# Patient Record
Sex: MALE | Race: WHITE | Employment: STUDENT | URBAN - NONMETROPOLITAN AREA
[De-identification: names, ages, dates, MRNs, and addresses within clinical notes are randomized per-mention and may not be internally consistent; named-entity substitution may affect disease eponyms.]

---

## 2019-05-15 ENCOUNTER — HOSPITAL ENCOUNTER (OUTPATIENT)
Dept: GENERAL RADIOLOGY | Age: 24
Discharge: HOME | End: 2019-05-17
Payer: COMMERCIAL

## 2019-05-15 ENCOUNTER — OFFICE VISIT (OUTPATIENT)
Dept: FAMILY MEDICINE CLINIC | Age: 24
End: 2019-05-15
Payer: COMMERCIAL

## 2019-05-15 ENCOUNTER — HOSPITAL ENCOUNTER (OUTPATIENT)
Dept: LAB | Age: 24
Discharge: HOME | End: 2019-05-15
Payer: COMMERCIAL

## 2019-05-15 VITALS
RESPIRATION RATE: 14 BRPM | DIASTOLIC BLOOD PRESSURE: 70 MMHG | SYSTOLIC BLOOD PRESSURE: 122 MMHG | HEART RATE: 78 BPM | BODY MASS INDEX: 21.47 KG/M2 | OXYGEN SATURATION: 98 % | HEIGHT: 73 IN | WEIGHT: 162 LBS

## 2019-05-15 DIAGNOSIS — Z76.89 ENCOUNTER TO ESTABLISH CARE: ICD-10-CM

## 2019-05-15 DIAGNOSIS — E78.00 ELEVATED CHOLESTEROL: ICD-10-CM

## 2019-05-15 DIAGNOSIS — M79.671 RIGHT FOOT PAIN: ICD-10-CM

## 2019-05-15 DIAGNOSIS — M21.41 PES PLANUS OF BOTH FEET: ICD-10-CM

## 2019-05-15 DIAGNOSIS — M89.8X6 PAIN OF LEFT FIBULA: ICD-10-CM

## 2019-05-15 DIAGNOSIS — Z13.9 SCREENING FOR CONDITION: ICD-10-CM

## 2019-05-15 DIAGNOSIS — M21.42 PES PLANUS OF BOTH FEET: ICD-10-CM

## 2019-05-15 DIAGNOSIS — Z23 NEED FOR VACCINATION: Primary | ICD-10-CM

## 2019-05-15 LAB
ALBUMIN SERPL-MCNC: 5.3 G/DL (ref 3.5–5.2)
ALBUMIN/GLOBULIN RATIO: 1.8 (ref 1–2.5)
ALP BLD-CCNC: 71 U/L (ref 40–129)
ALT SERPL-CCNC: 25 U/L (ref 5–41)
ANION GAP SERPL CALCULATED.3IONS-SCNC: 12 MMOL/L (ref 9–17)
AST SERPL-CCNC: 25 U/L
BILIRUB SERPL-MCNC: 0.49 MG/DL (ref 0.3–1.2)
BUN BLDV-MCNC: 11 MG/DL (ref 6–20)
BUN/CREAT BLD: 11 (ref 9–20)
CALCIUM SERPL-MCNC: 9.8 MG/DL (ref 8.6–10.4)
CHLORIDE BLD-SCNC: 98 MMOL/L (ref 98–107)
CHOLESTEROL, FASTING: 231 MG/DL
CHOLESTEROL/HDL RATIO: 4.8
CO2: 28 MMOL/L (ref 20–31)
CREAT SERPL-MCNC: 1.03 MG/DL (ref 0.7–1.2)
GFR AFRICAN AMERICAN: >60 ML/MIN
GFR NON-AFRICAN AMERICAN: >60 ML/MIN
GFR SERPL CREATININE-BSD FRML MDRD: ABNORMAL ML/MIN/{1.73_M2}
GFR SERPL CREATININE-BSD FRML MDRD: ABNORMAL ML/MIN/{1.73_M2}
GLUCOSE BLD-MCNC: 90 MG/DL (ref 70–99)
HDLC SERPL-MCNC: 48 MG/DL
LDL CHOLESTEROL: 134 MG/DL (ref 0–130)
POTASSIUM SERPL-SCNC: 4 MMOL/L (ref 3.7–5.3)
SODIUM BLD-SCNC: 138 MMOL/L (ref 135–144)
TOTAL PROTEIN: 8.2 G/DL (ref 6.4–8.3)
TRIGLYCERIDE, FASTING: 243 MG/DL
TSH SERPL DL<=0.05 MIU/L-ACNC: 3.5 MIU/L (ref 0.3–5)
VITAMIN D 25-HYDROXY: 15.2 NG/ML (ref 30–100)
VLDLC SERPL CALC-MCNC: ABNORMAL MG/DL (ref 1–30)

## 2019-05-15 PROCEDURE — 90715 TDAP VACCINE 7 YRS/> IM: CPT | Performed by: PHYSICIAN ASSISTANT

## 2019-05-15 PROCEDURE — 84443 ASSAY THYROID STIM HORMONE: CPT

## 2019-05-15 PROCEDURE — 36415 COLL VENOUS BLD VENIPUNCTURE: CPT

## 2019-05-15 PROCEDURE — 80053 COMPREHEN METABOLIC PANEL: CPT

## 2019-05-15 PROCEDURE — 82306 VITAMIN D 25 HYDROXY: CPT

## 2019-05-15 PROCEDURE — 90471 IMMUNIZATION ADMIN: CPT | Performed by: PHYSICIAN ASSISTANT

## 2019-05-15 PROCEDURE — 73630 X-RAY EXAM OF FOOT: CPT

## 2019-05-15 PROCEDURE — 73590 X-RAY EXAM OF LOWER LEG: CPT

## 2019-05-15 PROCEDURE — 99385 PREV VISIT NEW AGE 18-39: CPT | Performed by: PHYSICIAN ASSISTANT

## 2019-05-15 PROCEDURE — 80061 LIPID PANEL: CPT

## 2019-05-15 ASSESSMENT — ENCOUNTER SYMPTOMS
RESPIRATORY NEGATIVE: 1
DIARRHEA: 0
NAUSEA: 0
VOMITING: 0

## 2019-05-15 ASSESSMENT — PATIENT HEALTH QUESTIONNAIRE - PHQ9
SUM OF ALL RESPONSES TO PHQ QUESTIONS 1-9: 0
SUM OF ALL RESPONSES TO PHQ9 QUESTIONS 1 & 2: 0
1. LITTLE INTEREST OR PLEASURE IN DOING THINGS: 0
2. FEELING DOWN, DEPRESSED OR HOPELESS: 0
SUM OF ALL RESPONSES TO PHQ QUESTIONS 1-9: 0

## 2019-05-15 NOTE — PROGRESS NOTES
[facility]    Subjective:      Patient ID: Brigida Nieto is a 25 y.o. y.o. male. Patient is seen to day to establish care lives in [place] and has not been seen in quite some time. No recent illness. Mom wants him to have lab work. 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 Will be graduating next semester. He is a runner and the right foot has been hurting the the left distal fibula. The foot hurts day after he runs. The ankle will hurt one mile into the run. Has to stop due to the left leg. Does ice these areas. If hits left lateral malleolus the foot will go numb. States stretches before and after. The foot and ankle have been bothering him at least 3-4 months now. Stopped running the past several months and they feel better. No trauma otherwise or previous injury. History reviewed. No pertinent past medical history. Past Surgical History:   Procedure Laterality Date    WISDOM TOOTH EXTRACTION         Family History   Problem Relation Age of Onset    Heart Disease Mother     Breast Cancer Maternal Grandmother        No Known Allergies    No current outpatient medications on file. No current facility-administered medications for this visit. Review of Systems   Constitutional: Negative for appetite change, chills, fatigue and fever. Eyes:        No glasses KOBE unknown. Respiratory: Negative. Cardiovascular: Negative for chest pain and palpitations. Gastrointestinal: Negative for diarrhea, nausea and vomiting. Genitourinary: Negative. Musculoskeletal: Positive for arthralgias and gait problem. Negative for myalgias. Skin: Negative. Negative for rash. Neurological: Positive for numbness. Negative for dizziness, light-headedness and headaches. Psychiatric/Behavioral: Negative for sleep disturbance. The patient is not nervous/anxious.           Objective:      /70   Pulse 78   Resp 14   Ht 6' 1.25\" (1.861 m)   Wt 162 lb (73.5 kg)   SpO2 98% BMI 21.23 kg/m²     Physical Exam   Constitutional: He is oriented to person, place, and time. He appears well-developed and well-nourished. No distress. HENT:   Head: Normocephalic and atraumatic. Nose: Nose normal.   Mouth/Throat: Oropharynx is clear and moist. No oropharyngeal exudate. Eyes: Conjunctivae are normal. No scleral icterus. Neck: Normal range of motion. Neck supple. No thyromegaly present. Cardiovascular: Normal rate, regular rhythm, normal heart sounds and intact distal pulses. Exam reveals no gallop and no friction rub. No murmur heard. Pulmonary/Chest: Effort normal and breath sounds normal. He has no wheezes. He has no rales. Abdominal: Soft. Bowel sounds are normal. He exhibits no distension and no mass. There is no tenderness. There is no rebound and no guarding. No hernia. Musculoskeletal: Normal range of motion. He exhibits tenderness. He exhibits no edema. He has a normal gait. Has flat feet bilaterally. No swelling redness ecchymoses noted in the feet and ankles or the left lower extremity. He has pain with palpation over the fifth metatarsal right foot. Some pain over the mid to distal left fibula. No swelling noted. He has pain with dorsiflexion plantar flexion inversion eversion in the left fibula and base of the lateral right foot with range of motion. Lymphadenopathy:     He has no cervical adenopathy. Neurological: He is alert and oriented to person, place, and time. He exhibits normal muscle tone. Normal strength and sensation lower extremities bilaterally. Skin: Skin is warm and dry. Capillary refill takes less than 2 seconds. No rash noted. Psychiatric: He has a normal mood and affect. His behavior is normal.   Nursing note and vitals reviewed. Xr Tibia Fibula Left (2 Views)    Result Date: 5/15/2019  EXAMINATION: XRAY VIEWS OF THE LEFT TIBIA AND FIBULA 5/15/2019 11:08 am COMPARISON: None.  HISTORY: ORDERING SYSTEM PROVIDED HISTORY: Pain of left fibula TECHNOLOGIST PROVIDED HISTORY: pain in left mid to distal fibula is a runner no trauma Ordering Physician Provided Reason for Exam: Left distal tib/fib pain for 4 1/2 months. No known injury, patient is a runner. Acuity: Chronic Type of Exam: Initial FINDINGS: No fracture or malalignment identified. The joint spaces are maintained. No discrete soft tissue abnormality identified. No osseous abnormality identified. Further evaluation may be warranted with MRI or bone scan. Xr Foot Right (min 3 Views)    Result Date: 5/15/2019  EXAMINATION: 3 XRAY VIEWS OF THE RIGHT FOOT 5/15/2019 11:08 am COMPARISON: None. HISTORY: ORDERING SYSTEM PROVIDED HISTORY: Right foot pain TECHNOLOGIST PROVIDED HISTORY: right foot pain laterally Ordering Physician Provided Reason for Exam: Right foot pain for several years, comes and goes. Pain at 5th metatarsal, patient is a runner. Acuity: Chronic Type of Exam: Initial FINDINGS: No evidence of acute fracture. There is normal alignment of the tarsometatarsal joints. No acute joint abnormality. No focal osseous lesion. No focal soft tissue abnormality. Negative right foot with no acute osseous abnormality or significant arthritic change. Assessment & Plan:        Diagnosis Orders   1. Need for vaccination  Tdap (age 6y and older) IM ([address])   2. Screening for condition  Comprehensive Metabolic Panel    Lipid, Fasting    TSH With Reflex Ft4    Vitamin D 25 Hydroxy   3. Right foot pain  XR FOOT RIGHT (MIN 3 VIEWS)    ROSEANNE Velasquez DPM, Podiatry, [place]   4. Pain of left fibula  XR TIBIA FIBULA LEFT (2 VIEWS)    ROSEANNE Velasquez DPM, Podiatry, [place]   5. Pes planus of both feet  Megha Rodriguez DPM, Podiatry, [place]   6. Elevated cholesterol  Lipid Panel    TSH With Reflex Ft4   7.  Encounter to establish care         He will be notified of all results  Further treatment based on results  Home exercises for the feet and leg given to patient  Ice heat  Needs to see podiatry for orthotics this should help a lot as well  Answered his questions  Lifestyle changes  Follow-up one month sooner if problems      KARLI Chamberlain  5/15/2019 10:16 AM    (Pleasenote that portions of this note were completed with a voice recognition program.Efforts were made to edit the dictations but occasionally words are mis-transcribed.)

## 2019-05-15 NOTE — PATIENT INSTRUCTIONS
around the ball of one foot. Hold one end of the towel in each hand and gently pull the towel toward your body. Hold for at least 15 to 30 seconds. Repeat with the other foot. Do this 2 to 4 times a day. ? Make your feet stronger: Place a towel on the floor. Sit in a chair in front of the towel with both feet flat on the towel at one end.  the towel with the toes of one foot while keeping the heel of that foot on the floor. (Use your other foot to anchor the towel). Curl your toes to pull the towel toward you. Repeat with the other foot. Do this 2 to 4 times a day. · Take anti-inflammatory medicines such as ibuprofen (Advil, Motrin) and naproxen (Aleve) to reduce pain if your feet or legs hurt. Read and follow all instructions on the label. · Try heat or massage on the area that is causing you pain. Use a heating pad set on low or a warm cloth. Put a thin cloth between the heating pad and your skin. When should you call for help? Watch closely for changes in your health, and be sure to contact your doctor if:    · You have pain in your feet or legs.     · You want help to find orthotics to fit your feet. Where can you learn more? Go to https://[employer]. org and sign in to your [employer] account. Enter R092 in the [facility] box to learn more about \"Flatfoot: Care Instructions. \"     If you do not have an account, please click on the \"Sign Up Now\" link. Current as of: September 20, 2018  Content Version: 12.0  © 9321-3190 [employer]. Care instructions adapted under license by [facility] ([facility]). If you have questions about a medical condition or this instruction, always ask your healthcare professional. [address] any warranty or liability for your use of this information.

## 2019-05-16 DIAGNOSIS — E55.9 VITAMIN D DEFICIENCY: Primary | ICD-10-CM
